# Patient Record
Sex: FEMALE | Race: ASIAN | Employment: FULL TIME | URBAN - METROPOLITAN AREA
[De-identification: names, ages, dates, MRNs, and addresses within clinical notes are randomized per-mention and may not be internally consistent; named-entity substitution may affect disease eponyms.]

---

## 2018-02-28 ENCOUNTER — HOSPITAL ENCOUNTER (EMERGENCY)
Facility: HOSPITAL | Age: 58
Discharge: HOME | End: 2018-02-28
Attending: EMERGENCY MEDICINE
Payer: COMMERCIAL

## 2018-02-28 ENCOUNTER — APPOINTMENT (OUTPATIENT)
Dept: GENERAL RADIOLOGY | Facility: HOSPITAL | Age: 58
End: 2018-02-28
Attending: EMERGENCY MEDICINE
Payer: COMMERCIAL

## 2018-02-28 VITALS
RESPIRATION RATE: 16 BRPM | OXYGEN SATURATION: 97 % | BODY MASS INDEX: 25.68 KG/M2 | DIASTOLIC BLOOD PRESSURE: 66 MMHG | WEIGHT: 136 LBS | HEIGHT: 61 IN | SYSTOLIC BLOOD PRESSURE: 140 MMHG | HEART RATE: 76 BPM | TEMPERATURE: 98 F

## 2018-02-28 DIAGNOSIS — M79.622 AXILLARY PAIN, LEFT: Primary | ICD-10-CM

## 2018-02-28 LAB
ALBUMIN SERPL-MCNC: 3.7 G/DL (ref 3.5–4.8)
ALP LIVER SERPL-CCNC: 97 U/L (ref 46–118)
ALT SERPL-CCNC: 21 U/L (ref 14–54)
AST SERPL-CCNC: 15 U/L (ref 15–41)
ATRIAL RATE: 74 BPM
BASOPHILS # BLD AUTO: 0.03 X10(3) UL (ref 0–0.1)
BASOPHILS NFR BLD AUTO: 0.5 %
BILIRUB SERPL-MCNC: 0.3 MG/DL (ref 0.1–2)
BUN BLD-MCNC: 15 MG/DL (ref 8–20)
CALCIUM BLD-MCNC: 9.1 MG/DL (ref 8.3–10.3)
CHLORIDE: 108 MMOL/L (ref 101–111)
CO2: 27 MMOL/L (ref 22–32)
CREAT BLD-MCNC: 0.8 MG/DL (ref 0.55–1.02)
D-DIMER: <0.27 UG/ML FEU (ref 0–0.49)
EOSINOPHIL # BLD AUTO: 0.15 X10(3) UL (ref 0–0.3)
EOSINOPHIL NFR BLD AUTO: 2.3 %
ERYTHROCYTE [DISTWIDTH] IN BLOOD BY AUTOMATED COUNT: 12.8 % (ref 11.5–16)
GLUCOSE BLD-MCNC: 107 MG/DL (ref 70–99)
HCT VFR BLD AUTO: 42.2 % (ref 34–50)
HGB BLD-MCNC: 14 G/DL (ref 12–16)
IMMATURE GRANULOCYTE COUNT: 0.01 X10(3) UL (ref 0–1)
IMMATURE GRANULOCYTE RATIO %: 0.2 %
LYMPHOCYTES # BLD AUTO: 2.26 X10(3) UL (ref 0.9–4)
LYMPHOCYTES NFR BLD AUTO: 34.8 %
M PROTEIN MFR SERPL ELPH: 7.3 G/DL (ref 6.1–8.3)
MCH RBC QN AUTO: 29.2 PG (ref 27–33.2)
MCHC RBC AUTO-ENTMCNC: 33.2 G/DL (ref 31–37)
MCV RBC AUTO: 87.9 FL (ref 81–100)
MONOCYTES # BLD AUTO: 0.38 X10(3) UL (ref 0.1–1)
MONOCYTES NFR BLD AUTO: 5.9 %
NEUTROPHIL ABS PRELIM: 3.66 X10 (3) UL (ref 1.3–6.7)
NEUTROPHILS # BLD AUTO: 3.66 X10(3) UL (ref 1.3–6.7)
NEUTROPHILS NFR BLD AUTO: 56.3 %
P AXIS: 42 DEGREES
P-R INTERVAL: 142 MS
PLATELET # BLD AUTO: 248 10(3)UL (ref 150–450)
POTASSIUM SERPL-SCNC: 4.1 MMOL/L (ref 3.6–5.1)
Q-T INTERVAL: 422 MS
QRS DURATION: 92 MS
QTC CALCULATION (BEZET): 468 MS
R AXIS: 18 DEGREES
RBC # BLD AUTO: 4.8 X10(6)UL (ref 3.8–5.1)
RED CELL DISTRIBUTION WIDTH-SD: 41.1 FL (ref 35.1–46.3)
SODIUM SERPL-SCNC: 142 MMOL/L (ref 136–144)
T AXIS: 27 DEGREES
TROPONIN: <0.046 NG/ML (ref ?–0.05)
VENTRICULAR RATE: 74 BPM
WBC # BLD AUTO: 6.5 X10(3) UL (ref 4–13)

## 2018-02-28 PROCEDURE — 93005 ELECTROCARDIOGRAM TRACING: CPT

## 2018-02-28 PROCEDURE — 71045 X-RAY EXAM CHEST 1 VIEW: CPT | Performed by: EMERGENCY MEDICINE

## 2018-02-28 PROCEDURE — 85025 COMPLETE CBC W/AUTO DIFF WBC: CPT | Performed by: EMERGENCY MEDICINE

## 2018-02-28 PROCEDURE — 80053 COMPREHEN METABOLIC PANEL: CPT | Performed by: EMERGENCY MEDICINE

## 2018-02-28 PROCEDURE — 99285 EMERGENCY DEPT VISIT HI MDM: CPT

## 2018-02-28 PROCEDURE — 93010 ELECTROCARDIOGRAM REPORT: CPT

## 2018-02-28 PROCEDURE — 36415 COLL VENOUS BLD VENIPUNCTURE: CPT

## 2018-02-28 PROCEDURE — 85378 FIBRIN DEGRADE SEMIQUANT: CPT | Performed by: EMERGENCY MEDICINE

## 2018-02-28 PROCEDURE — 84484 ASSAY OF TROPONIN QUANT: CPT | Performed by: EMERGENCY MEDICINE

## 2018-02-28 RX ORDER — ASPIRIN 81 MG/1
324 TABLET, CHEWABLE ORAL ONCE
Status: COMPLETED | OUTPATIENT
Start: 2018-02-28 | End: 2018-02-28

## 2018-02-28 NOTE — ED INITIAL ASSESSMENT (HPI)
Pt here for chest pain started 4 days ago. Was seen at her doctor's office and advised to go to ED.   Pt states she's experiencing stabbing pain on the side of her left chest.

## 2018-02-28 NOTE — ED PROVIDER NOTES
Patient Seen in: [facility] Emergency Department    History   Patient presents with:  Chest Pain Angina (cardiovascular)    Stated Complaint: chest pain    HPI    Patient is a 50-year-old female presents to emergency room for evaluation of left axil Temp 97.9 °F (36.6 °C) (Temporal)   Resp 16   Ht 154.9 cm (5' 1\")   Wt 61.7 kg   SpO2 97%   BMI 25.70 kg/m²         Physical Exam    GENERAL: No acute distress, well appearing and non-toxic, Alert and oriented X 3   HEENT: Normocephalic, atraumatic.   Mois DIFFERENTIAL[[id number]]                              Final result                 Please view results for these tests on the individual orders.    RAINBOW DRAW BLUE   RAINBOW DRAW LAVENDER   RAINBOW DRAW LIGHT GREEN   RAINBOW DRAW GOLD   CBC W/ DIFFERENTIAL written discharge and follow-up instructions were provided to help prevent relapse or worsening.   Patient was instructed to follow-up with her primary care provider for further evaluation and treatment, but to return immediately to the ER for worsening, co

## 2018-02-28 NOTE — ED NOTES
Pt reevaluated by er physician. Informed of her test reports and plan of care. Pt verbalizing understanding.

## 2018-04-20 ENCOUNTER — HOSPITAL ENCOUNTER (OUTPATIENT)
Dept: CT IMAGING | Facility: HOSPITAL | Age: 58
Discharge: HOME | End: 2018-04-20
Attending: INTERNAL MEDICINE

## 2018-04-20 DIAGNOSIS — Z13.6 SCREENING FOR CARDIOVASCULAR CONDITION: ICD-10-CM

## 2021-01-15 ENCOUNTER — HOSPITAL ENCOUNTER (OUTPATIENT)
Dept: GENERAL RADIOLOGY | Facility: HOSPITAL | Age: 61
Discharge: HOME | End: 2021-01-15
Attending: INTERNAL MEDICINE
Payer: COMMERCIAL

## 2021-01-15 DIAGNOSIS — U07.1 COVID-19: ICD-10-CM

## 2021-01-15 DIAGNOSIS — R05.9 COUGH: ICD-10-CM

## 2021-01-15 PROCEDURE — 71046 X-RAY EXAM CHEST 2 VIEWS: CPT | Performed by: INTERNAL MEDICINE

## 2021-01-23 ENCOUNTER — HOSPITAL ENCOUNTER (OUTPATIENT)
Dept: CT IMAGING | Facility: HOSPITAL | Age: 61
Discharge: HOME | End: 2021-01-23
Attending: INTERNAL MEDICINE
Payer: COMMERCIAL

## 2021-01-23 ENCOUNTER — LAB ENCOUNTER (OUTPATIENT)
Dept: LAB | Facility: HOSPITAL | Age: 61
End: 2021-01-23
Attending: INTERNAL MEDICINE
Payer: COMMERCIAL

## 2021-01-23 DIAGNOSIS — U07.1 COVID-19: ICD-10-CM

## 2021-01-23 DIAGNOSIS — J18.9 PNEUMONIA: ICD-10-CM

## 2021-01-23 LAB — CREAT BLD-MCNC: 0.7 MG/DL

## 2021-01-23 PROCEDURE — 82565 ASSAY OF CREATININE: CPT

## 2021-01-23 PROCEDURE — 71260 CT THORAX DX C+: CPT | Performed by: INTERNAL MEDICINE

## 2021-02-24 ENCOUNTER — HOSPITAL ENCOUNTER (OUTPATIENT)
Dept: MAMMOGRAPHY | Facility: HOSPITAL | Age: 61
Discharge: HOME | End: 2021-02-24
Attending: INTERNAL MEDICINE
Payer: COMMERCIAL

## 2021-02-24 DIAGNOSIS — Z12.31 ENCOUNTER FOR SCREENING MAMMOGRAM FOR MALIGNANT NEOPLASM OF BREAST: ICD-10-CM

## 2021-02-24 PROCEDURE — 77067 SCR MAMMO BI INCL CAD: CPT | Performed by: INTERNAL MEDICINE

## 2021-02-24 PROCEDURE — 77063 BREAST TOMOSYNTHESIS BI: CPT | Performed by: INTERNAL MEDICINE

## 2021-09-02 ENCOUNTER — EKG ENCOUNTER (OUTPATIENT)
Dept: LAB | Facility: HOSPITAL | Age: 61
End: 2021-09-02
Attending: INTERNAL MEDICINE
Payer: COMMERCIAL

## 2021-09-02 DIAGNOSIS — R42 DIZZINESS: ICD-10-CM

## 2021-09-02 DIAGNOSIS — E78.5 HYPERLIPIDEMIA: Primary | ICD-10-CM

## 2021-09-02 DIAGNOSIS — I70.90 ATHEROSCLEROSIS: ICD-10-CM

## 2021-09-02 PROCEDURE — 93005 ELECTROCARDIOGRAM TRACING: CPT

## 2021-09-02 PROCEDURE — 93010 ELECTROCARDIOGRAM REPORT: CPT | Performed by: INTERNAL MEDICINE

## 2021-09-07 ENCOUNTER — HOSPITAL ENCOUNTER (OUTPATIENT)
Dept: CV DIAGNOSTICS | Age: 61
Discharge: HOME | End: 2021-09-07
Attending: INTERNAL MEDICINE
Payer: COMMERCIAL

## 2021-09-07 ENCOUNTER — EKG ENCOUNTER (OUTPATIENT)
Dept: LAB | Facility: HOSPITAL | Age: 61
End: 2021-09-07
Attending: INTERNAL MEDICINE
Payer: COMMERCIAL

## 2021-09-07 DIAGNOSIS — R94.31 ABNORMAL EKG: Primary | ICD-10-CM

## 2021-09-07 DIAGNOSIS — I10 HTN (HYPERTENSION): ICD-10-CM

## 2021-09-07 DIAGNOSIS — R94.31 ABNORMAL EKG: ICD-10-CM

## 2021-09-07 LAB
ALBUMIN SERPL-MCNC: 3.7 G/DL (ref 3.4–5)
ALBUMIN/GLOB SERPL: 1 {RATIO} (ref 1–2)
ALP LIVER SERPL-CCNC: 82 U/L
ALT SERPL-CCNC: 30 U/L
ANION GAP SERPL CALC-SCNC: 4 MMOL/L (ref 0–18)
AST SERPL-CCNC: 16 U/L (ref 15–37)
ATRIAL RATE: 80 BPM
BILIRUB SERPL-MCNC: 0.4 MG/DL (ref 0.1–2)
BUN BLD-MCNC: 16 MG/DL (ref 7–18)
CALCIUM BLD-MCNC: 8.7 MG/DL (ref 8.5–10.1)
CHLORIDE SERPL-SCNC: 108 MMOL/L (ref 98–112)
CO2 SERPL-SCNC: 28 MMOL/L (ref 21–32)
CREAT BLD-MCNC: 0.74 MG/DL
GLOBULIN PLAS-MCNC: 3.7 G/DL (ref 2.8–4.4)
GLUCOSE BLD-MCNC: 98 MG/DL (ref 70–99)
M PROTEIN MFR SERPL ELPH: 7.4 G/DL (ref 6.4–8.2)
OSMOLALITY SERPL CALC.SUM OF ELEC: 291 MOSM/KG (ref 275–295)
P AXIS: 37 DEGREES
P-R INTERVAL: 144 MS
PATIENT FASTING Y/N/NP: NO
POTASSIUM SERPL-SCNC: 3.7 MMOL/L (ref 3.5–5.1)
Q-T INTERVAL: 412 MS
QRS DURATION: 86 MS
QTC CALCULATION (BEZET): 475 MS
R AXIS: 22 DEGREES
SODIUM SERPL-SCNC: 140 MMOL/L (ref 136–145)
T AXIS: 15 DEGREES
VENTRICULAR RATE: 80 BPM

## 2021-09-07 PROCEDURE — 93010 ELECTROCARDIOGRAM REPORT: CPT | Performed by: INTERNAL MEDICINE

## 2021-09-07 PROCEDURE — 93306 TTE W/DOPPLER COMPLETE: CPT | Performed by: INTERNAL MEDICINE

## 2021-09-07 PROCEDURE — 80053 COMPREHEN METABOLIC PANEL: CPT

## 2021-09-07 PROCEDURE — 36415 COLL VENOUS BLD VENIPUNCTURE: CPT

## 2021-09-07 PROCEDURE — 93005 ELECTROCARDIOGRAM TRACING: CPT

## 2021-10-05 ENCOUNTER — ORDER TRANSCRIPTION (OUTPATIENT)
Dept: ADMINISTRATIVE | Facility: HOSPITAL | Age: 61
End: 2021-10-05

## 2021-10-05 DIAGNOSIS — Z13.6 SCREENING FOR CARDIOVASCULAR CONDITION: Primary | ICD-10-CM

## 2021-10-09 ENCOUNTER — HOSPITAL ENCOUNTER (OUTPATIENT)
Dept: CT IMAGING | Facility: HOSPITAL | Age: 61
Discharge: HOME | End: 2021-10-09
Attending: INTERNAL MEDICINE

## 2021-10-09 DIAGNOSIS — E78.5 HYPERLIPIDEMIA: ICD-10-CM

## 2021-10-09 DIAGNOSIS — Z13.6 SCREENING FOR CARDIOVASCULAR CONDITION: ICD-10-CM

## 2021-10-09 DIAGNOSIS — R42 DIZZINESS: ICD-10-CM

## 2021-10-09 DIAGNOSIS — I70.90 ATHEROSCLEROSIS: ICD-10-CM

## 2021-11-08 NOTE — PROGRESS NOTES
Patient completed the Heart Scan on 10/9/21. In addition to her calcium score of 0.0, patient has a CARDIAC INCIDENTAL FINDING: Dilated Thoracic Aorta (3.88 cm). I called patient and left VMM about this and to please call our RN line back.

## 2022-07-20 ENCOUNTER — HOSPITAL ENCOUNTER (OUTPATIENT)
Dept: ULTRASOUND IMAGING | Age: 62
Discharge: HOME | End: 2022-07-20
Attending: INTERNAL MEDICINE
Payer: COMMERCIAL

## 2022-07-20 ENCOUNTER — HOSPITAL ENCOUNTER (OUTPATIENT)
Dept: MAMMOGRAPHY | Age: 62
Discharge: HOME | End: 2022-07-20
Attending: INTERNAL MEDICINE
Payer: COMMERCIAL

## 2022-07-20 DIAGNOSIS — R74.8 ELEVATED LIVER ENZYMES: ICD-10-CM

## 2022-07-20 DIAGNOSIS — Z12.31 BREAST CANCER SCREENING BY MAMMOGRAM: ICD-10-CM

## 2022-07-20 PROCEDURE — 77063 BREAST TOMOSYNTHESIS BI: CPT | Performed by: INTERNAL MEDICINE

## 2022-07-20 PROCEDURE — 76700 US EXAM ABDOM COMPLETE: CPT | Performed by: INTERNAL MEDICINE

## 2022-07-20 PROCEDURE — 77067 SCR MAMMO BI INCL CAD: CPT | Performed by: INTERNAL MEDICINE

## 2022-10-17 ENCOUNTER — ORDER TRANSCRIPTION (OUTPATIENT)
Dept: SLEEP CENTER | Age: 62
End: 2022-10-17

## 2022-10-17 DIAGNOSIS — R06.81 APNEA: Primary | ICD-10-CM

## 2022-10-27 ENCOUNTER — TELEPHONE (OUTPATIENT)
Dept: SLEEP CENTER | Age: 62
End: 2022-10-27

## 2022-11-03 ENCOUNTER — HOSPITAL ENCOUNTER (OUTPATIENT)
Dept: ULTRASOUND IMAGING | Age: 62
Discharge: HOME | End: 2022-11-03
Attending: INTERNAL MEDICINE
Payer: COMMERCIAL

## 2022-11-03 DIAGNOSIS — R60.0 BILATERAL LEG EDEMA: ICD-10-CM

## 2022-11-03 PROCEDURE — 93970 EXTREMITY STUDY: CPT | Performed by: INTERNAL MEDICINE

## 2022-11-08 ENCOUNTER — OFFICE VISIT (OUTPATIENT)
Dept: SLEEP CENTER | Age: 62
End: 2022-11-08
Attending: INTERNAL MEDICINE
Payer: COMMERCIAL

## 2022-11-08 DIAGNOSIS — R06.83 SNORING: ICD-10-CM

## 2022-11-08 DIAGNOSIS — R06.81 APNEA: ICD-10-CM

## 2022-11-08 PROCEDURE — 95806 SLEEP STUDY UNATT&RESP EFFT: CPT

## 2022-11-11 NOTE — PROCEDURES
[address]       Accredited by the [employer] (AASM)    PATIENT'S NAME:        OTD CAMPBELL  ATTENDING PHYSICIAN:   Han Phillips M.D. REFERRING PHYSICIAN:   Dave Martin M.D. PATIENT ACCOUNT #:     [de-identified]        LOCATION:       [facility] RECORD #:      [id number]        YOB: 1960  DATE OF STUDY:         11/08/2022    SLEEP STUDY REPORT    STUDY TYPE:  Unattended sleep study. DEMOGRAPHICS:  Patient's height is 5 feet. Patient's weight is 140 pounds. Body mass index is 27.3. Temple score 3. ICD-9 code is G47.33. CLINICAL HISTORY:  The patient is a 80-year-old female with complaints of daytime fatigue and sleepiness, who presents for the evaluation of obstructive sleep apnea. UNATTENDED SLEEP STUDY RECORDING PARAMETERS:  The patient underwent a formal technically adequate unattended diagnostic sleep study coordinated with the [facility]. The study was performed in accordance with the AASM standard for Out of Center Sleep Testing. The four-channel Type III HST measures the following parameters:  flow, respiratory effort, pulse, and oxygen saturation. SCORING:  This study was scored in accordance with AASM scoring rules and Medicare rule 1B. SLEEP RECORDING AND CONTINUITY:  Total recording time was 6 hours and 42 minutes. Total flow monitoring was 6 hours and 23 minutes. Total oxygen saturation evaluation was 6 hours and 27 minutes. RESPIRATORY MEASURES:  Respiratory monitoring showed obstructive apneas and hypopneas with an apnea-hypopnea index of 15. Sleep-disordered breathing resulted in mild oxyhemoglobin desaturations with a ginger of 85%, and 0% of the night's study was spent with an oxygen saturation of less than or equal to 88%. ECG:  Average heart rate was 71 beats per minute, with a minimum of 57 and a maximum of 94 beats per minute.     PERIODIC LIMB MOVEMENTS: Periodic limb movements could not be assessed with the equipment used. IMPRESSION:  This study, along with clinical history, is consistent with obstructive sleep apnea-hypopnea syndrome. RECOMMENDATIONS:  A trial of nasal CPAP is recommended. The patient will need to return for an overnight polysomnography to perform a CPAP titration. Per the ordering physician's request, we will defer notification of these results and followup to their office. If desired, we would be happy to see the patient in the [place] [id number] for further treatment and management. The patient should avoid the use of alcohol and sedating medications at bedtime and engage in a formal weight loss program.  The patient should avoid driving while sleepy. Dictated By Katherine Echevarria M.D.  d: 11/11/2022 13:21:37  t: 11/11/2022 15:15:32  Job [id number]  /    cc: GHANSHYAM Collins M.D.

## 2022-11-18 ENCOUNTER — ORDER TRANSCRIPTION (OUTPATIENT)
Dept: SLEEP CENTER | Age: 62
End: 2022-11-18

## 2022-11-18 DIAGNOSIS — G47.33 OSA (OBSTRUCTIVE SLEEP APNEA): Primary | ICD-10-CM

## 2022-11-18 DIAGNOSIS — Z11.59 SCREENING FOR VIRAL DISEASE: Primary | ICD-10-CM

## 2022-12-14 ENCOUNTER — ORDER TRANSCRIPTION (OUTPATIENT)
Dept: SLEEP CENTER | Age: 62
End: 2022-12-14

## 2022-12-14 DIAGNOSIS — Z01.818 PREPROCEDURAL EXAMINATION: ICD-10-CM

## 2022-12-14 DIAGNOSIS — Z11.59 SCREENING FOR VIRAL DISEASE: Primary | ICD-10-CM

## 2022-12-14 DIAGNOSIS — R06.81 APNEA: Primary | ICD-10-CM

## 2023-10-13 ENCOUNTER — EKG ENCOUNTER (OUTPATIENT)
Dept: LAB | Age: 63
End: 2023-10-13
Attending: INTERNAL MEDICINE
Payer: COMMERCIAL

## 2023-10-13 DIAGNOSIS — R94.31 ABNORMAL EKG: Primary | ICD-10-CM

## 2023-10-13 PROCEDURE — 93010 ELECTROCARDIOGRAM REPORT: CPT | Performed by: INTERNAL MEDICINE

## 2023-10-13 PROCEDURE — 93005 ELECTROCARDIOGRAM TRACING: CPT

## 2023-10-14 LAB
ATRIAL RATE: 76 BPM
P AXIS: 37 DEGREES
P-R INTERVAL: 144 MS
Q-T INTERVAL: 428 MS
QRS DURATION: 90 MS
QTC CALCULATION (BEZET): 481 MS
R AXIS: 16 DEGREES
T AXIS: 18 DEGREES
VENTRICULAR RATE: 76 BPM

## 2023-10-16 ENCOUNTER — HOSPITAL ENCOUNTER (OUTPATIENT)
Dept: GENERAL RADIOLOGY | Age: 63
Discharge: HOME | End: 2023-10-16
Attending: INTERNAL MEDICINE
Payer: COMMERCIAL

## 2023-10-16 DIAGNOSIS — R94.31 ABNORMAL EKG: ICD-10-CM

## 2023-10-16 PROCEDURE — 71046 X-RAY EXAM CHEST 2 VIEWS: CPT | Performed by: INTERNAL MEDICINE

## 2023-11-15 ENCOUNTER — HOSPITAL ENCOUNTER (OUTPATIENT)
Dept: CV DIAGNOSTICS | Facility: HOSPITAL | Age: 63
Discharge: HOME | End: 2023-11-15
Attending: INTERNAL MEDICINE
Payer: COMMERCIAL

## 2023-11-15 DIAGNOSIS — R94.31 ABNORMAL EKG: ICD-10-CM

## 2023-11-15 PROCEDURE — 93306 TTE W/DOPPLER COMPLETE: CPT | Performed by: INTERNAL MEDICINE

## (undated) NOTE — ED AVS SNAPSHOT
Renetta Nicholas   MRN: [id number]    Department:  [facility] Emergency Department   Date of Visit:  2/28/2018           Disclosure     Insurance plans vary and the physician(s) referred by the ER may not be covered by your plan.  Please contact your ins tell this physician (or your personal doctor if your instructions are to return to your personal doctor) about any new or lasting problems. The primary care or specialist physician will see patients referred from the [facility] Emergency Department.  Shelton Lombard